# Patient Record
Sex: FEMALE | Race: WHITE | NOT HISPANIC OR LATINO | URBAN - METROPOLITAN AREA
[De-identification: names, ages, dates, MRNs, and addresses within clinical notes are randomized per-mention and may not be internally consistent; named-entity substitution may affect disease eponyms.]

---

## 2022-04-30 ENCOUNTER — TELEPHONE ENCOUNTER (OUTPATIENT)
Dept: URBAN - METROPOLITAN AREA CLINIC 121 | Facility: CLINIC | Age: 37
End: 2022-04-30

## 2022-04-30 RX ORDER — MESALAMINE 1000 MG/1
UNWRAP AND INSERT 1 SUPPOSITORY RECTALLY QHS SUPPOSITORY RECTAL
OUTPATIENT
Start: 2019-05-24

## 2022-04-30 RX ORDER — MESALAMINE 1000 MG/1
QD SUPPOSITORY RECTAL
OUTPATIENT
Start: 2015-06-05

## 2022-05-01 ENCOUNTER — TELEPHONE ENCOUNTER (OUTPATIENT)
Dept: URBAN - METROPOLITAN AREA CLINIC 121 | Facility: CLINIC | Age: 37
End: 2022-05-01

## 2022-05-01 RX ORDER — IBUPROFEN 200 MG/1
PRN TABLET, FILM COATED ORAL
Status: ACTIVE | COMMUNITY
Start: 2015-03-24

## 2022-05-01 RX ORDER — MESALAMINE 1000 MG/1
UNWRAP AND INSERT 1 SUPPOSITORY RECTALLY AT BEDTIME SUPPOSITORY RECTAL
Status: ACTIVE | COMMUNITY
Start: 2020-07-17

## 2022-05-01 RX ORDER — MESALAMINE 1000 MG/1
INSERT 1 SUPPOSITORY PR QHS SUPPOSITORY RECTAL
Status: ACTIVE | COMMUNITY
Start: 2017-04-11

## 2022-05-01 RX ORDER — VALACYCLOVIR HCL 500 MG
PRN TABLET ORAL
Status: ACTIVE | COMMUNITY
Start: 2015-03-24

## 2022-05-01 RX ORDER — NORGESTIMATE AND ETHINYL ESTRADIOL
QD KIT
Status: ACTIVE | COMMUNITY
Start: 2015-03-24

## 2022-05-05 ENCOUNTER — TELEPHONE ENCOUNTER (OUTPATIENT)
Dept: URBAN - METROPOLITAN AREA CLINIC 27 | Facility: CLINIC | Age: 37
End: 2022-05-05

## 2022-05-05 RX ORDER — MESALAMINE 1000 MG/1
1 SUPPOSITORY AT BEDTIME SUPPOSITORY RECTAL ONCE A DAY
Qty: 30 | Refills: 3

## 2022-05-12 ENCOUNTER — TELEPHONE ENCOUNTER (OUTPATIENT)
Dept: URBAN - METROPOLITAN AREA CLINIC 27 | Facility: CLINIC | Age: 37
End: 2022-05-12

## 2022-05-12 RX ORDER — MESALAMINE 1000 MG/1
1 SUPPOSITORY AT BEDTIME SUPPOSITORY RECTAL ONCE A DAY
Qty: 30 | Refills: 3 | OUTPATIENT
Start: 2022-05-12 | End: 2022-09-09

## 2022-05-19 ENCOUNTER — OFFICE VISIT (OUTPATIENT)
Dept: URBAN - METROPOLITAN AREA CLINIC 27 | Facility: CLINIC | Age: 37
End: 2022-05-19
Payer: COMMERCIAL

## 2022-05-19 ENCOUNTER — LAB OUTSIDE AN ENCOUNTER (OUTPATIENT)
Dept: URBAN - METROPOLITAN AREA CLINIC 27 | Facility: CLINIC | Age: 37
End: 2022-05-19

## 2022-05-19 ENCOUNTER — TELEPHONE ENCOUNTER (OUTPATIENT)
Dept: URBAN - METROPOLITAN AREA CLINIC 27 | Facility: CLINIC | Age: 37
End: 2022-05-19

## 2022-05-19 DIAGNOSIS — K51.30 ULCERATIVE RECTOSIGMOIDITIS WITHOUT COMPLICATION: ICD-10-CM

## 2022-05-19 PROCEDURE — 99213 OFFICE O/P EST LOW 20 MIN: CPT | Performed by: INTERNAL MEDICINE

## 2022-05-19 PROCEDURE — 99214 OFFICE O/P EST MOD 30 MIN: CPT | Performed by: INTERNAL MEDICINE

## 2022-05-19 RX ORDER — MESALAMINE 1000 MG/1
1 SUPPOSITORY AT BEDTIME SUPPOSITORY RECTAL ONCE A DAY
Qty: 30 | Refills: 3 | Status: ACTIVE | COMMUNITY
Start: 2022-05-12 | End: 2022-09-09

## 2022-05-19 RX ORDER — NORGESTIMATE AND ETHINYL ESTRADIOL
QD KIT
Status: ACTIVE | COMMUNITY
Start: 2015-03-24

## 2022-05-19 RX ORDER — VALACYCLOVIR HCL 500 MG
PRN TABLET ORAL
Status: ACTIVE | COMMUNITY
Start: 2015-03-24

## 2022-05-19 RX ORDER — IBUPROFEN 200 MG/1
PRN TABLET, FILM COATED ORAL
Status: ACTIVE | COMMUNITY
Start: 2015-03-24

## 2022-05-19 RX ORDER — MESALAMINE 1000 MG/1
1 SUPPOSITORY AT BEDTIME SUPPOSITORY RECTAL ONCE A DAY
Qty: 30 | OUTPATIENT
Start: 2022-05-19 | End: 2022-06-18

## 2022-05-19 RX ORDER — MESALAMINE 1000 MG/1
UNWRAP AND INSERT 1 SUPPOSITORY RECTALLY AT BEDTIME SUPPOSITORY RECTAL
Status: ACTIVE | COMMUNITY
Start: 2020-07-17

## 2022-05-19 RX ORDER — MESALAMINE 1000 MG/1
1 SUPPOSITORY AT BEDTIME SUPPOSITORY RECTAL ONCE A DAY
Qty: 30 | Refills: 3 | Status: ACTIVE | COMMUNITY

## 2022-05-19 NOTE — PHYSICAL EXAM CONSTITUTIONAL:
pleasant, well nourished, well developed, in no acute distress , normal communication ability English

## 2022-05-19 NOTE — HPI-TODAY'S VISIT:
36-year-old female with history of ulcerative colitis presents for follow-up.  She uses Canasa suppositories as needed, usually 3-4 times per year.  Last flare was about a month ago.  Flares usually present as rectal bleeding and diarrhea and urgency.  No abdominal pain.  Last colonoscopy was 2017 that showed left-sided ulcerative colitis, no dysplasia.  She has only required steroids twice.

## 2022-09-12 PROBLEM — 41364008: Status: ACTIVE | Noted: 2022-05-19

## 2022-10-19 ENCOUNTER — WEB ENCOUNTER (OUTPATIENT)
Dept: URBAN - METROPOLITAN AREA SURGERY CENTER 7 | Facility: SURGERY CENTER | Age: 37
End: 2022-10-19

## 2022-10-25 ENCOUNTER — OFFICE VISIT (OUTPATIENT)
Dept: URBAN - METROPOLITAN AREA SURGERY CENTER 7 | Facility: SURGERY CENTER | Age: 37
End: 2022-10-25
Payer: COMMERCIAL

## 2022-10-25 ENCOUNTER — WEB ENCOUNTER (OUTPATIENT)
Dept: URBAN - METROPOLITAN AREA SURGERY CENTER 7 | Facility: SURGERY CENTER | Age: 37
End: 2022-10-25

## 2022-10-25 DIAGNOSIS — K64.0 BLEEDING GRADE I HEMORRHOIDS: ICD-10-CM

## 2022-10-25 DIAGNOSIS — K51.30 CHRONIC ULCERATIVE PROCTOSIGMOIDITIS, WITHOUT COMPLICATIONS: ICD-10-CM

## 2022-10-25 PROCEDURE — 45380 COLONOSCOPY AND BIOPSY: CPT | Performed by: INTERNAL MEDICINE

## 2022-10-25 PROCEDURE — G8907 PT DOC NO EVENTS ON DISCHARG: HCPCS | Performed by: INTERNAL MEDICINE

## 2022-10-25 RX ORDER — NORGESTIMATE AND ETHINYL ESTRADIOL
QD KIT
Status: ACTIVE | COMMUNITY
Start: 2015-03-24

## 2022-10-25 RX ORDER — MESALAMINE 1000 MG/1
1 SUPPOSITORY AT BEDTIME SUPPOSITORY RECTAL ONCE A DAY
Qty: 30 | Refills: 3 | Status: ACTIVE | COMMUNITY

## 2022-10-25 RX ORDER — VALACYCLOVIR HCL 500 MG
PRN TABLET ORAL
Status: ACTIVE | COMMUNITY
Start: 2015-03-24

## 2022-10-25 RX ORDER — MESALAMINE 1000 MG/1
UNWRAP AND INSERT 1 SUPPOSITORY RECTALLY AT BEDTIME SUPPOSITORY RECTAL
Status: ACTIVE | COMMUNITY
Start: 2020-07-17

## 2022-10-25 RX ORDER — IBUPROFEN 200 MG/1
PRN TABLET, FILM COATED ORAL
Status: ACTIVE | COMMUNITY
Start: 2015-03-24

## 2024-08-22 ENCOUNTER — WEB ENCOUNTER (OUTPATIENT)
Dept: URBAN - METROPOLITAN AREA CLINIC 27 | Facility: CLINIC | Age: 39
End: 2024-08-22

## 2024-08-22 RX ORDER — MESALAMINE 1000 MG/1
1 SUPPOSITORY AT BEDTIME SUPPOSITORY RECTAL ONCE A DAY
Qty: 30 | Refills: 3
End: 2024-12-20

## 2024-10-21 ENCOUNTER — TELEPHONE ENCOUNTER (OUTPATIENT)
Dept: URBAN - METROPOLITAN AREA CLINIC 27 | Facility: CLINIC | Age: 39
End: 2024-10-21

## 2024-10-24 ENCOUNTER — LAB OUTSIDE AN ENCOUNTER (OUTPATIENT)
Dept: URBAN - METROPOLITAN AREA CLINIC 27 | Facility: CLINIC | Age: 39
End: 2024-10-24

## 2024-10-24 PROBLEM — 444548001: Status: ACTIVE | Noted: 2024-10-24

## 2024-12-06 ENCOUNTER — OFFICE VISIT (OUTPATIENT)
Dept: URBAN - METROPOLITAN AREA SURGERY CENTER 7 | Facility: SURGERY CENTER | Age: 39
End: 2024-12-06

## 2024-12-20 ENCOUNTER — OFFICE VISIT (OUTPATIENT)
Dept: URBAN - METROPOLITAN AREA SURGERY CENTER 7 | Facility: SURGERY CENTER | Age: 39
End: 2024-12-20

## 2025-01-03 ENCOUNTER — OFFICE VISIT (OUTPATIENT)
Dept: URBAN - METROPOLITAN AREA SURGERY CENTER 7 | Facility: SURGERY CENTER | Age: 40
End: 2025-01-03

## 2025-01-31 ENCOUNTER — OFFICE VISIT (OUTPATIENT)
Dept: URBAN - METROPOLITAN AREA SURGERY CENTER 7 | Facility: SURGERY CENTER | Age: 40
End: 2025-01-31

## 2025-02-10 ENCOUNTER — CLAIMS CREATED FROM THE CLAIM WINDOW (OUTPATIENT)
Dept: URBAN - METROPOLITAN AREA CLINIC 4 | Facility: CLINIC | Age: 40
End: 2025-02-10
Payer: COMMERCIAL

## 2025-02-10 ENCOUNTER — CLAIMS CREATED FROM THE CLAIM WINDOW (OUTPATIENT)
Dept: URBAN - METROPOLITAN AREA SURGERY CENTER 7 | Facility: SURGERY CENTER | Age: 40
End: 2025-02-10
Payer: COMMERCIAL

## 2025-02-10 DIAGNOSIS — Z87.19 PERSONAL HISTORY OF OTHER DISEASES OF THE DIGESTIVE SYSTEM: ICD-10-CM

## 2025-02-10 DIAGNOSIS — K63.89 OTHER SPECIFIED DISEASES OF INTESTINE: ICD-10-CM

## 2025-02-10 DIAGNOSIS — K51.00 ULCERATIVE PAN COLITIS: ICD-10-CM

## 2025-02-10 DIAGNOSIS — Z12.11 COLON CANCER SCREENING (HIGH RISK): ICD-10-CM

## 2025-02-10 PROCEDURE — 88305 TISSUE EXAM BY PATHOLOGIST: CPT | Performed by: PATHOLOGY

## 2025-02-10 PROCEDURE — 45380 COLONOSCOPY AND BIOPSY: CPT | Performed by: INTERNAL MEDICINE

## 2025-02-10 PROCEDURE — 00812 ANES LWR INTST SCR COLSC: CPT | Performed by: REGISTERED NURSE

## 2025-02-10 RX ORDER — NORGESTIMATE AND ETHINYL ESTRADIOL
QD KIT
Status: ACTIVE | COMMUNITY
Start: 2015-03-24

## 2025-02-10 RX ORDER — VALACYCLOVIR HCL 500 MG
PRN TABLET ORAL
Status: ACTIVE | COMMUNITY
Start: 2015-03-24

## 2025-02-10 RX ORDER — MESALAMINE 1000 MG/1
UNWRAP AND INSERT 1 SUPPOSITORY RECTALLY AT BEDTIME SUPPOSITORY RECTAL
Status: ACTIVE | COMMUNITY
Start: 2020-07-17

## 2025-02-10 RX ORDER — IBUPROFEN 200 MG/1
PRN TABLET, FILM COATED ORAL
Status: ACTIVE | COMMUNITY
Start: 2015-03-24